# Patient Record
Sex: FEMALE | Race: BLACK OR AFRICAN AMERICAN | NOT HISPANIC OR LATINO | Employment: OTHER | URBAN - METROPOLITAN AREA
[De-identification: names, ages, dates, MRNs, and addresses within clinical notes are randomized per-mention and may not be internally consistent; named-entity substitution may affect disease eponyms.]

---

## 2017-03-06 ENCOUNTER — OFFICE VISIT (OUTPATIENT)
Dept: OBSTETRICS AND GYNECOLOGY | Facility: CLINIC | Age: 57
End: 2017-03-06

## 2017-03-06 VITALS
DIASTOLIC BLOOD PRESSURE: 73 MMHG | SYSTOLIC BLOOD PRESSURE: 118 MMHG | HEART RATE: 75 BPM | BODY MASS INDEX: 23.08 KG/M2 | WEIGHT: 143.6 LBS | HEIGHT: 66 IN

## 2017-03-06 DIAGNOSIS — N94.11 SUPERFICIAL DYSPAREUNIA: Primary | ICD-10-CM

## 2017-03-06 DIAGNOSIS — Z12.4 PAP SMEAR FOR CERVICAL CANCER SCREENING: ICD-10-CM

## 2017-03-06 DIAGNOSIS — N95.2 ATROPHIC VAGINITIS: ICD-10-CM

## 2017-03-06 DIAGNOSIS — N93.0 POSTCOITAL BLEEDING: ICD-10-CM

## 2017-03-06 DIAGNOSIS — Z01.419 ENCOUNTER FOR GYNECOLOGICAL EXAMINATION WITHOUT ABNORMAL FINDING: ICD-10-CM

## 2017-03-06 PROCEDURE — G0101 CA SCREEN;PELVIC/BREAST EXAM: HCPCS | Performed by: OBSTETRICS & GYNECOLOGY

## 2017-03-06 RX ORDER — CYCLOBENZAPRINE HCL 5 MG
5 TABLET ORAL EVERY 12 HOURS
COMMUNITY
Start: 2013-11-04

## 2017-03-06 RX ORDER — TRAMADOL HYDROCHLORIDE 50 MG/1
TABLET ORAL
COMMUNITY
Start: 2017-02-28

## 2017-03-06 NOTE — PROGRESS NOTES
Subjective   Rosie Maharaj is a 56 y.o. female  1, Para 1 AB 0, Living 1.  Last annual 1y, last pap , last mammogram 1y, last colonoscopy 8y.  An annual exam  History of Present Illness  Patient's only problem is some postcoital spotting and superficial dyspareunia  The following portions of the patient's history were reviewed and updated as appropriate: allergies, current medications, past family history, past medical history, past social history, past surgical history and problem list.    Review of Systems   Genitourinary: Positive for dyspareunia.        Postcoital spotting   All other systems reviewed and are negative.        Past Medical History   Diagnosis Date   • Liver disease, chronic      Abnormal liver enzymes under workup     Menstrual History:  OB History      Para Term  AB TAB SAB Ectopic Multiple Living    1 1        1         Menarche age: 13  No LMP recorded. Patient has had an ablation.  Period Cycle (Days): 28  Period Pattern:post coital spotting  Menstrual Flow: Light  Menstrual Control: Panty liner  Dysmenorrhea: None    Past Surgical History   Procedure Laterality Date   • Tubal abdominal ligation     • Endometrial ablation     • Lumbar laminectomy       OB History      Para Term  AB TAB SAB Ectopic Multiple Living    1 1        1        Family History   Problem Relation Age of Onset   • Breast cancer Mother 60   • Diabetes Mother    • Stomach cancer Brother    • Hypertension Brother      History   Smoking Status   • Former Smoker   • Packs/day: 1.00   • Years: 10.00   Smokeless Tobacco   • Never Used     Comment: Stopped at age 26     History   Alcohol Use No     Health Maintenance   Topic Date Due   • TDAP/TD VACCINES (1 - Tdap) 1980   • INFLUENZA VACCINE  2016   • HEPATITIS C SCREENING  2017   • MEDICARE ANNUAL WELLNESS  2017       Current Outpatient Prescriptions:   •  cyclobenzaprine (FLEXERIL) 5 MG tablet, Take 5 mg by  "mouth Every 12 (Twelve) Hours., Disp: , Rfl:   •  traMADol (ULTRAM) 50 MG tablet, , Disp: , Rfl:   Sexual History: Active  Sexually Transmitted Infection History: Gonorrhea    I.       Objective   Vitals:    03/06/17 1037   BP: 118/73   Pulse: 75   Weight: 143 lb 9.6 oz (65.1 kg)   Height: 66\" (167.6 cm)     Physical Exam   Constitutional: She is oriented to person, place, and time. She appears well-developed and well-nourished.   HENT:   Head: Normocephalic.   Eyes: Pupils are equal, round, and reactive to light.   Neck: Normal range of motion. No thyromegaly present.   Cardiovascular: Normal rate, regular rhythm, normal heart sounds and intact distal pulses.    Pulmonary/Chest: Effort normal and breath sounds normal. No respiratory distress. She exhibits no tenderness. Right breast exhibits no inverted nipple, no mass, no nipple discharge, no skin change and no tenderness. Left breast exhibits no inverted nipple, no mass, no nipple discharge, no skin change and no tenderness. Breasts are symmetrical.   Abdominal: Soft. Bowel sounds are normal. Hernia confirmed negative in the right inguinal area and confirmed negative in the left inguinal area.   Genitourinary: Rectum normal, vagina normal and uterus normal. Rectal exam shows no external hemorrhoid, no internal hemorrhoid, no fissure, no mass, no tenderness and anal tone normal. No breast tenderness or discharge. Pelvic exam was performed with patient supine. There is no rash, tenderness, lesion or injury on the right labia. There is no rash, tenderness, lesion or injury on the left labia. Uterus is not enlarged and not tender. Cervix exhibits no motion tenderness, no discharge and no friability. Right adnexum displays no mass, no tenderness and no fullness. Left adnexum displays no mass, no tenderness and no fullness. No vaginal discharge found.   Genitourinary Comments: Vaginal atrophy   Lymphadenopathy:     She has no cervical adenopathy.        Right: No " inguinal adenopathy present.        Left: No inguinal adenopathy present.   Neurological: She is alert and oriented to person, place, and time. She has normal reflexes.   Skin: Skin is warm and dry.   Psychiatric: She has a normal mood and affect. Her behavior is normal. Judgment and thought content normal.         Assessment/Plan   Rosie was seen today for gynecologic exam.    Diagnoses and all orders for this visit:    Superficial dyspareunia    Atrophic vaginitis    Encounter for gynecological examination without abnormal finding  Comments:  Schedule mammogram, Pap smear    Postcoital bleeding    Pap smear for cervical cancer screening  -     IGP, Aptima HPV, Rfx 16 / 18,45

## 2017-03-10 LAB
CYTOLOGIST CVX/VAG CYTO: NORMAL
CYTOLOGY CVX/VAG DOC THIN PREP: NORMAL
DX ICD CODE: NORMAL
HIV 1 & 2 AB SER-IMP: NORMAL
HPV I/H RISK 4 DNA CVX QL PROBE+SIG AMP: NEGATIVE
OTHER STN SPEC: NORMAL
PATH REPORT.FINAL DX SPEC: NORMAL
STAT OF ADQ CVX/VAG CYTO-IMP: NORMAL

## 2017-03-20 ENCOUNTER — APPOINTMENT (OUTPATIENT)
Dept: WOMENS IMAGING | Facility: HOSPITAL | Age: 57
End: 2017-03-20

## 2017-03-20 PROCEDURE — G0202 SCR MAMMO BI INCL CAD: HCPCS | Performed by: RADIOLOGY

## 2017-03-20 PROCEDURE — 77063 BREAST TOMOSYNTHESIS BI: CPT | Performed by: RADIOLOGY

## 2017-03-21 ENCOUNTER — TELEPHONE (OUTPATIENT)
Dept: OBSTETRICS AND GYNECOLOGY | Facility: CLINIC | Age: 57
End: 2017-03-21

## 2017-03-21 NOTE — TELEPHONE ENCOUNTER
----- Message from Ignacio England MD sent at 3/21/2017 11:00 AM EDT -----  Tell the patient her mammogram was normal  ----- Message -----     From: Interface, Scans Incoming     Sent: 3/21/2017   9:42 AM       To: Ignacio England MD

## 2018-03-27 ENCOUNTER — PROCEDURE VISIT (OUTPATIENT)
Dept: OBSTETRICS AND GYNECOLOGY | Facility: CLINIC | Age: 58
End: 2018-03-27

## 2018-03-27 ENCOUNTER — TELEPHONE (OUTPATIENT)
Dept: OBSTETRICS AND GYNECOLOGY | Facility: CLINIC | Age: 58
End: 2018-03-27

## 2018-03-27 ENCOUNTER — APPOINTMENT (OUTPATIENT)
Dept: WOMENS IMAGING | Facility: HOSPITAL | Age: 58
End: 2018-03-27

## 2018-03-27 DIAGNOSIS — Z87.39 HISTORY OF OSTEOPENIA: ICD-10-CM

## 2018-03-27 DIAGNOSIS — Z12.31 VISIT FOR SCREENING MAMMOGRAM: Primary | ICD-10-CM

## 2018-03-27 DIAGNOSIS — Z00.00 ENCOUNTER FOR PREVENTIVE HEALTH EXAMINATION: Primary | ICD-10-CM

## 2018-03-27 PROCEDURE — 77080 DXA BONE DENSITY AXIAL: CPT | Performed by: OBSTETRICS & GYNECOLOGY

## 2018-03-27 PROCEDURE — 77067 SCR MAMMO BI INCL CAD: CPT | Performed by: OBSTETRICS & GYNECOLOGY

## 2018-03-27 PROCEDURE — 77067 SCR MAMMO BI INCL CAD: CPT | Performed by: RADIOLOGY

## 2018-03-27 NOTE — TELEPHONE ENCOUNTER
Left message to call office, called pt about dexa results showing mild osteopenia. She will need to take calcium daily and we will recheck in a few years. Rimma

## 2018-03-29 ENCOUNTER — TELEPHONE (OUTPATIENT)
Dept: OBSTETRICS AND GYNECOLOGY | Facility: CLINIC | Age: 58
End: 2018-03-29

## 2018-03-29 NOTE — TELEPHONE ENCOUNTER
----- Message from Ignacio England MD sent at 3/29/2018  9:27 AM EDT -----  Tell the patient her mammogram was negative  ----- Message -----  From: Interface, Scans Incoming  Sent: 3/29/2018   8:59 AM  To: Ignacio England MD

## 2019-08-02 ENCOUNTER — APPOINTMENT (OUTPATIENT)
Dept: WOMENS IMAGING | Facility: HOSPITAL | Age: 59
End: 2019-08-02

## 2019-08-02 ENCOUNTER — OFFICE VISIT (OUTPATIENT)
Dept: OBSTETRICS AND GYNECOLOGY | Facility: CLINIC | Age: 59
End: 2019-08-02

## 2019-08-02 ENCOUNTER — PROCEDURE VISIT (OUTPATIENT)
Dept: OBSTETRICS AND GYNECOLOGY | Facility: CLINIC | Age: 59
End: 2019-08-02

## 2019-08-02 VITALS
HEIGHT: 66 IN | DIASTOLIC BLOOD PRESSURE: 78 MMHG | BODY MASS INDEX: 23.46 KG/M2 | WEIGHT: 146 LBS | HEART RATE: 80 BPM | SYSTOLIC BLOOD PRESSURE: 129 MMHG

## 2019-08-02 DIAGNOSIS — Z01.419 WELL WOMAN EXAM WITH ROUTINE GYNECOLOGICAL EXAM: Primary | ICD-10-CM

## 2019-08-02 DIAGNOSIS — Z12.31 VISIT FOR SCREENING MAMMOGRAM: Primary | ICD-10-CM

## 2019-08-02 PROCEDURE — G0101 CA SCREEN;PELVIC/BREAST EXAM: HCPCS | Performed by: OBSTETRICS & GYNECOLOGY

## 2019-08-02 PROCEDURE — 77067 SCR MAMMO BI INCL CAD: CPT | Performed by: OBSTETRICS & GYNECOLOGY

## 2019-08-02 PROCEDURE — 77067 SCR MAMMO BI INCL CAD: CPT | Performed by: RADIOLOGY

## 2019-08-02 RX ORDER — ATORVASTATIN CALCIUM 10 MG/1
10 TABLET, FILM COATED ORAL
Refills: 1 | COMMUNITY
Start: 2019-06-10 | End: 2021-08-09

## 2019-08-02 RX ORDER — LEVOTHYROXINE SODIUM 0.03 MG/1
TABLET ORAL
COMMUNITY
End: 2021-08-09

## 2019-08-02 RX ORDER — POTASSIUM GLUCONATE 595(99)MG
TABLET ORAL
COMMUNITY

## 2019-08-02 RX ORDER — MELOXICAM 15 MG/1
TABLET ORAL
COMMUNITY
Start: 2019-05-16 | End: 2021-08-09

## 2019-08-02 RX ORDER — GABAPENTIN 300 MG/1
CAPSULE ORAL
COMMUNITY

## 2019-08-02 NOTE — PROGRESS NOTES
Chief Complaint   Patient presents with   • Annual Exam     last pap: 3.6.17 NL -HPV, last mammo: 8.2.19, last colonoscopy:  NL         Rosie Maharaj is a 58 y.o.  who presents for an annual examination.  C/o vaginal dryness, unrelieved with KY jelly    Pap history:  Last pap: 2017 NIL, HPV negative  Prior abnormal paps: no  DXA:  2018 - T score spine -1.5, screen at 66  Colonoscopy:  Yes   Mammogram: 19   STDs  Sexually active: yes  History of STDs: no  Menopause:  Age: 40's  Bleeding since? no  Vasomotor symptoms: no  HRT: no  Dyspareunia: yes- dryness      Is patient's family or personal history significant for any risks for BRCA? no    Past Medical History:   Diagnosis Date   • Liver disease, chronic     Abnormal liver enzymes under workup     Past Surgical History:   Procedure Laterality Date   • ENDOMETRIAL ABLATION     • LUMBAR LAMINECTOMY     • TUBAL ABDOMINAL LIGATION       OB History    Para Term  AB Living   1 1 1     1   SAB TAB Ectopic Molar Multiple Live Births             1      # Outcome Date GA Lbr Doc/2nd Weight Sex Delivery Anes PTL Lv   1 Term      Vag-Spont   DEC        Social History     Tobacco Use   • Smoking status: Former Smoker     Packs/day: 1.00     Years: 10.00     Pack years: 10.00   • Smokeless tobacco: Never Used   • Tobacco comment: Stopped at age 26   Substance Use Topics   • Alcohol use: No   • Drug use: No     Family History   Problem Relation Age of Onset   • Breast cancer Mother 60   • Diabetes Mother    • Stomach cancer Brother    • Hypertension Brother    • Deep vein thrombosis Neg Hx    • Pulmonary embolism Neg Hx    • Colon cancer Neg Hx    • Uterine cancer Neg Hx    • Ovarian cancer Neg Hx      Current Outpatient Medications on File Prior to Visit   Medication Sig Dispense Refill   • atorvastatin (LIPITOR) 10 MG tablet Take 10 mg by mouth every night at bedtime.  1   • Calcium Carb-Cholecalciferol (CALCIUM 1000 + D) 1000-800  "MG-UNIT tablet calcium   1 qd 1200mg     • Cod Liver Oil 10 MINIM capsule cod liver oil   daily     • cyclobenzaprine (FLEXERIL) 5 MG tablet Take 5 mg by mouth Every 12 (Twelve) Hours.     • gabapentin (NEURONTIN) 300 MG capsule gabapentin 300 mg capsule   TAKE 1 CAPSULE BY MOUTH ONCE DAILY--MUST LAST 30 DAYS.     • levothyroxine (SYNTHROID, LEVOTHROID) 25 MCG tablet levothyroxine 25 mcg tablet   Take 1 tablet every day by oral route.     • meloxicam (MOBIC) 15 MG tablet      • Multiple Vitamin (MULTI-VITAMIN DAILY PO) Multi Vitamin   daily     • traMADol (ULTRAM) 50 MG tablet        No current facility-administered medications on file prior to visit.      Allergies   Allergen Reactions   • Fosamax [Alendronate] Other (See Comments)     PER PT \"FELT WEIRD\"         Review of Systems   Constitutional: Negative for chills, fever and unexpected weight change.   HENT: Negative for congestion, nosebleeds and sore throat.    Eyes: Negative for visual disturbance.   Respiratory: Negative for cough, chest tightness and shortness of breath.    Cardiovascular: Negative for chest pain and palpitations.   Gastrointestinal: Negative for abdominal pain, constipation, diarrhea, nausea and vomiting.   Endocrine: Negative for polyuria.   Genitourinary: Negative for difficulty urinating, dyspareunia, dysuria, frequency, genital sores, hematuria, menstrual problem, pelvic pain, urgency, vaginal bleeding, vaginal discharge and vaginal pain.   Musculoskeletal: Negative for arthralgias and joint swelling.   Skin: Negative for color change and rash.   Neurological: Negative for dizziness, light-headedness and headaches.   Hematological: Does not bruise/bleed easily.   Psychiatric/Behavioral: Negative for dysphoric mood. The patient is not nervous/anxious.        OBJECTIVE:   Vitals:    08/02/19 0944   BP: 129/78   Pulse: 80   Weight: 66.2 kg (146 lb)   Height: 167.6 cm (66\")      Physical Exam    ASSESSMENT/PLAN:  Well woman " counseling/screening:    Cervical cancer screening:    Reports no h/o cervical dysplasia   The patient is due for a pap in 2022.    Screening guidelines discussed with patient  Breast cancer screening:    Mammogram UTD   Breast self awareness encouraged  Colonscopy:   UTD  DXA   Due at 66 (prior with osteopenia, Tscore of -1.5  Family history    does not demonstrate need for genetics referral   Healthy lifestyle counseling:   return for routine annual checkups    BMI Counseling  Her BMI is classified as BMI 18.5-24.9       Classification: normal weight.  We reviewed that this is classified as normal weight for age

## 2019-08-15 ENCOUNTER — TELEPHONE (OUTPATIENT)
Dept: OBSTETRICS AND GYNECOLOGY | Facility: CLINIC | Age: 59
End: 2019-08-15

## 2019-08-15 NOTE — TELEPHONE ENCOUNTER
----- Message from Danitza Arreola MD sent at 8/8/2019  4:02 PM EDT -----  Please let patient know she had a benign mammogram. She has heterogeneously dense breasts which may obscure small masses.  Follow up in one year.    (based on media tab) Letter with mammogram results was mailed by mammogram office on 8/6/19

## 2020-08-05 ENCOUNTER — APPOINTMENT (OUTPATIENT)
Dept: WOMENS IMAGING | Facility: HOSPITAL | Age: 60
End: 2020-08-05

## 2020-08-05 ENCOUNTER — PROCEDURE VISIT (OUTPATIENT)
Dept: OBSTETRICS AND GYNECOLOGY | Facility: CLINIC | Age: 60
End: 2020-08-05

## 2020-08-05 DIAGNOSIS — Z12.31 VISIT FOR SCREENING MAMMOGRAM: Primary | ICD-10-CM

## 2020-08-05 DIAGNOSIS — Z78.0 POST-MENOPAUSAL: Primary | ICD-10-CM

## 2020-08-05 PROCEDURE — 77063 BREAST TOMOSYNTHESIS BI: CPT | Performed by: RADIOLOGY

## 2020-08-05 PROCEDURE — 77067 SCR MAMMO BI INCL CAD: CPT | Performed by: RADIOLOGY

## 2020-08-05 PROCEDURE — 77063 BREAST TOMOSYNTHESIS BI: CPT | Performed by: OBSTETRICS & GYNECOLOGY

## 2020-08-05 PROCEDURE — 77067 SCR MAMMO BI INCL CAD: CPT | Performed by: OBSTETRICS & GYNECOLOGY

## 2021-08-09 ENCOUNTER — APPOINTMENT (OUTPATIENT)
Dept: WOMENS IMAGING | Facility: HOSPITAL | Age: 61
End: 2021-08-09

## 2021-08-09 ENCOUNTER — PROCEDURE VISIT (OUTPATIENT)
Dept: OBSTETRICS AND GYNECOLOGY | Facility: CLINIC | Age: 61
End: 2021-08-09

## 2021-08-09 ENCOUNTER — OFFICE VISIT (OUTPATIENT)
Dept: OBSTETRICS AND GYNECOLOGY | Facility: CLINIC | Age: 61
End: 2021-08-09

## 2021-08-09 VITALS
SYSTOLIC BLOOD PRESSURE: 131 MMHG | WEIGHT: 140 LBS | DIASTOLIC BLOOD PRESSURE: 83 MMHG | BODY MASS INDEX: 22.5 KG/M2 | HEIGHT: 66 IN | HEART RATE: 82 BPM

## 2021-08-09 DIAGNOSIS — Z12.31 VISIT FOR SCREENING MAMMOGRAM: Primary | ICD-10-CM

## 2021-08-09 DIAGNOSIS — Z01.419 WELL WOMAN EXAM WITH ROUTINE GYNECOLOGICAL EXAM: Primary | ICD-10-CM

## 2021-08-09 PROBLEM — R74.8 INCREASED CREATINE KINASE LEVEL: Status: ACTIVE | Noted: 2017-02-07

## 2021-08-09 PROBLEM — N39.3 FEMALE STRESS INCONTINENCE: Status: ACTIVE | Noted: 2018-09-04

## 2021-08-09 PROBLEM — M51.369 DEGENERATION OF LUMBAR INTERVERTEBRAL DISC: Status: ACTIVE | Noted: 2021-08-09

## 2021-08-09 PROBLEM — Z83.511 FAMILY HISTORY OF GLAUCOMA: Status: ACTIVE | Noted: 2017-02-28

## 2021-08-09 PROBLEM — E55.9 VITAMIN D DEFICIENCY: Status: ACTIVE | Noted: 2019-05-14

## 2021-08-09 PROBLEM — F32.A DEPRESSIVE DISORDER: Status: ACTIVE | Noted: 2020-07-30

## 2021-08-09 PROBLEM — E03.9 HYPOTHYROIDISM: Status: ACTIVE | Noted: 2019-05-28

## 2021-08-09 PROBLEM — M51.36 DEGENERATION OF LUMBAR INTERVERTEBRAL DISC: Status: ACTIVE | Noted: 2021-08-09

## 2021-08-09 PROBLEM — E04.1 THYROID NODULE: Status: ACTIVE | Noted: 2017-02-26

## 2021-08-09 PROBLEM — K59.00 CONSTIPATION: Status: ACTIVE | Noted: 2017-08-15

## 2021-08-09 PROBLEM — M85.80 OSTEOPENIA: Status: ACTIVE | Noted: 2018-06-07

## 2021-08-09 PROCEDURE — 77063 BREAST TOMOSYNTHESIS BI: CPT | Performed by: RADIOLOGY

## 2021-08-09 PROCEDURE — 77063 BREAST TOMOSYNTHESIS BI: CPT | Performed by: OBSTETRICS & GYNECOLOGY

## 2021-08-09 PROCEDURE — 77067 SCR MAMMO BI INCL CAD: CPT | Performed by: RADIOLOGY

## 2021-08-09 PROCEDURE — 77067 SCR MAMMO BI INCL CAD: CPT | Performed by: OBSTETRICS & GYNECOLOGY

## 2021-08-09 PROCEDURE — 99396 PREV VISIT EST AGE 40-64: CPT | Performed by: OBSTETRICS & GYNECOLOGY

## 2021-08-09 NOTE — PROGRESS NOTES
Chief Complaint   Patient presents with   • Annual Exam     pap: 3/2017 NIL -HPV, mammo: 2021, colonoscopy: 2019        Rosie Maharaj is a 60 y.o.  who presents for an annual examination.      Pap history:  Last pap: 2017 NIL, HPV negative  Prior abnormal paps: no  DXA:  2018 - T score spine -1.5, screen at 66  Colonoscopy:  Yes 2019 - normal, repeat in 10 years  Mammogram: 22   STDs  Sexually active: yes, no intercourse in 3 yeras  History of STDs: no  Menopause:  Age: 40's  Bleeding since? no  Vasomotor symptoms: no  HRT: no  Dyspareunia: c/o dryness but has not been sexually active recently     Is patient's family or personal history significant for any risks for BRCA? no    Past Medical History:   Diagnosis Date   • Bony sclerosis    • Liver disease, chronic     Abnormal liver enzymes under workup     Past Surgical History:   Procedure Laterality Date   • ENDOMETRIAL ABLATION     • LUMBAR LAMINECTOMY     • TUBAL ABDOMINAL LIGATION       OB History    Para Term  AB Living   1 1 1     1   SAB TAB Ectopic Molar Multiple Live Births             1      # Outcome Date GA Lbr Doc/2nd Weight Sex Delivery Anes PTL Lv   1 Term      Vag-Spont   DEC     Social History     Tobacco Use   • Smoking status: Former Smoker     Packs/day: 1.00     Years: 10.00     Pack years: 10.00   • Smokeless tobacco: Never Used   • Tobacco comment: Stopped at age 26   Substance Use Topics   • Alcohol use: No   • Drug use: No     Family History   Problem Relation Age of Onset   • Breast cancer Mother 60   • Diabetes Mother    • Stomach cancer Brother    • Hypertension Brother    • Deep vein thrombosis Neg Hx    • Pulmonary embolism Neg Hx    • Colon cancer Neg Hx    • Uterine cancer Neg Hx    • Ovarian cancer Neg Hx      Current Outpatient Medications on File Prior to Visit   Medication Sig Dispense Refill   • Calcium Carb-Cholecalciferol (CALCIUM 1000 + D) 1000-800 MG-UNIT tablet calcium   1 qd 1200mg    "  • Cod Liver Oil 10 MINIM capsule cod liver oil   daily     • cyclobenzaprine (FLEXERIL) 5 MG tablet Take 5 mg by mouth Every 12 (Twelve) Hours.     • gabapentin (NEURONTIN) 300 MG capsule gabapentin 300 mg capsule   TAKE 1 CAPSULE BY MOUTH ONCE DAILY--MUST LAST 30 DAYS.     • Multiple Vitamin (MULTI-VITAMIN DAILY PO) Multi Vitamin   daily     • traMADol (ULTRAM) 50 MG tablet      • [DISCONTINUED] atorvastatin (LIPITOR) 10 MG tablet Take 10 mg by mouth every night at bedtime.  1   • [DISCONTINUED] levothyroxine (SYNTHROID, LEVOTHROID) 25 MCG tablet levothyroxine 25 mcg tablet   Take 1 tablet every day by oral route.     • [DISCONTINUED] meloxicam (MOBIC) 15 MG tablet        No current facility-administered medications on file prior to visit.     Allergies   Allergen Reactions   • Fosamax [Alendronate] Other (See Comments)     PER PT \"FELT WEIRD\"         Review of Systems   Constitutional: Negative for chills, fever and unexpected weight change.   HENT: Negative for congestion, nosebleeds and sore throat.    Eyes: Negative for visual disturbance.   Respiratory: Negative for cough, chest tightness and shortness of breath.    Cardiovascular: Negative for chest pain and palpitations.   Gastrointestinal: Negative for abdominal pain, constipation, diarrhea, nausea and vomiting.   Endocrine: Negative for polyuria.   Genitourinary: Negative for difficulty urinating, dyspareunia, dysuria, frequency, genital sores, hematuria, menstrual problem, pelvic pain, urgency, vaginal bleeding, vaginal discharge and vaginal pain.   Musculoskeletal: Negative for arthralgias and joint swelling.   Skin: Negative for color change and rash.   Neurological: Negative for dizziness, light-headedness and headaches.   Hematological: Does not bruise/bleed easily.   Psychiatric/Behavioral: Negative for dysphoric mood. The patient is not nervous/anxious.        OBJECTIVE:   Vitals:    08/09/21 1107   BP: 131/83   Pulse: 82   Weight: 63.5 kg (140 " "lb)   Height: 167.6 cm (66\")      Physical Exam   Constitutional: She is oriented to person, place, and time. She appears well-developed and well-nourished. No distress.   HENT:   Head: Normocephalic and atraumatic.   Eyes: No scleral icterus.   Neck: No thyromegaly present.   Cardiovascular: Normal rate and regular rhythm. Exam reveals no gallop and no friction rub.   No murmur heard.  Pulmonary/Chest: Effort normal and breath sounds normal. No respiratory distress. She has no wheezes. She has no rales. She exhibits no tenderness. Right breast exhibits no inverted nipple. Left breast exhibits no inverted nipple. No breast swelling, tenderness, discharge or bleeding. Breasts are symmetrical.   Abdominal: Soft. Bowel sounds are normal. She exhibits no distension. There is no abdominal tenderness. There is no guarding.   Genitourinary: Vagina normal and uterus normal. There is no rash, tenderness, lesion, injury or Bartholin's cyst on the right labia. There is no rash, tenderness, lesion, injury or Bartholin's cyst on the left labia. Uterus is not mobile.   Cervix is not parous. Cervix does not exhibit motion tenderness, discharge, friability, lesion, polyp, nabothian cyst, eversion, pinkness or cyanosis. Right adnexum displays no mass, no tenderness and no fullness. Right adnexum is present.Left adnexum displays no mass, no tenderness and no fullness. Left adnexum is present.No vaginal discharge found.   Musculoskeletal: No deformity.   Neurological: She is alert and oriented to person, place, and time.   Skin: Skin is warm and dry. She is not diaphoretic.   Psychiatric: Her speech is normal and behavior is normal. Judgment and thought content normal.       ASSESSMENT/PLAN:  Well woman counseling/screening:    Cervical cancer screening:    Reports no h/o cervical dysplasia   The patient is due for a pap in 2022.    Screening guidelines discussed with patient  Breast cancer screening:    Mammogram UTD   Breast self " awareness encouraged  Colonscopy:   UTD  DXA   Due at 66 (prior with osteopenia, Tscore of -1.5)  Family history    does not demonstrate need for genetics referral   Healthy lifestyle counseling:   return for routine annual checkups    BMI Counseling  Her BMI is classified as BMI 18.5-24.9       Classification: normal weight.  We reviewed that this is classified as normal weight for age

## 2022-07-14 ENCOUNTER — TELEPHONE (OUTPATIENT)
Dept: OBSTETRICS AND GYNECOLOGY | Facility: CLINIC | Age: 62
End: 2022-07-14

## 2022-07-14 NOTE — TELEPHONE ENCOUNTER
"Please reach out to patient. She had a DXA in 2020 at [facility] that showed lumbar -2.2 and left femoral neck -2.0. Typically would recommend repeat in 2 years (2022). I know she has an appointment with us coming up. Please see if she has had or would like to repeat her DXA prior.  Also, as an aside- she has medicare listed.  Last \"annual\" was 2021 and typically Medicare will only allow for an \"annual\" every 2 years, but mammogram every year and a problem visit as needed. Thanks!  "

## 2022-07-26 NOTE — TELEPHONE ENCOUNTER
Message left for patient to call office. She has an appointment scheduled with Dr. Arreola in November and she has MC. Also, she is due for a Dexa. Please see if she would like to get that scheduled. Thanks-Sarah

## 2022-07-28 NOTE — TELEPHONE ENCOUNTER
Patient is returning call and would like to schedule bone density at [facility] if possible.    Thank you

## 2022-08-01 DIAGNOSIS — Z78.0 POSTMENOPAUSAL: Primary | ICD-10-CM

## 2022-11-07 ENCOUNTER — PROCEDURE VISIT (OUTPATIENT)
Dept: OBSTETRICS AND GYNECOLOGY | Facility: CLINIC | Age: 62
End: 2022-11-07

## 2022-11-07 ENCOUNTER — APPOINTMENT (OUTPATIENT)
Dept: WOMENS IMAGING | Facility: HOSPITAL | Age: 62
End: 2022-11-07

## 2022-11-07 DIAGNOSIS — Z12.31 VISIT FOR SCREENING MAMMOGRAM: Primary | ICD-10-CM

## 2022-11-07 PROCEDURE — 77067 SCR MAMMO BI INCL CAD: CPT | Performed by: RADIOLOGY

## 2022-11-07 PROCEDURE — 77063 BREAST TOMOSYNTHESIS BI: CPT | Performed by: RADIOLOGY

## 2022-11-07 PROCEDURE — 77063 BREAST TOMOSYNTHESIS BI: CPT | Performed by: OBSTETRICS & GYNECOLOGY

## 2022-11-07 PROCEDURE — 77067 SCR MAMMO BI INCL CAD: CPT | Performed by: OBSTETRICS & GYNECOLOGY

## 2022-11-18 ENCOUNTER — TELEPHONE (OUTPATIENT)
Dept: OBSTETRICS AND GYNECOLOGY | Facility: CLINIC | Age: 62
End: 2022-11-18

## 2022-11-18 NOTE — TELEPHONE ENCOUNTER
----- Message from Danitza Arreola MD sent at 11/17/2022  2:02 PM EST -----  Please make sure patient is aware of benign mammogram imaging.  Thanks!

## 2024-02-02 ENCOUNTER — OFFICE VISIT (OUTPATIENT)
Dept: OBSTETRICS AND GYNECOLOGY | Facility: CLINIC | Age: 64
End: 2024-02-02
Payer: MEDICARE

## 2024-02-02 VITALS
DIASTOLIC BLOOD PRESSURE: 77 MMHG | HEART RATE: 89 BPM | HEIGHT: 66 IN | SYSTOLIC BLOOD PRESSURE: 144 MMHG | WEIGHT: 145 LBS | BODY MASS INDEX: 23.3 KG/M2

## 2024-02-02 DIAGNOSIS — Z01.419 WELL WOMAN EXAM WITH ROUTINE GYNECOLOGICAL EXAM: Primary | ICD-10-CM

## 2024-02-02 DIAGNOSIS — M81.0 AGE-RELATED OSTEOPOROSIS WITHOUT CURRENT PATHOLOGICAL FRACTURE: ICD-10-CM

## 2024-02-02 LAB
ALBUMIN SERPL-MCNC: 4.4 G/DL (ref 3.5–5.2)
ALBUMIN/GLOB SERPL: 1.9 G/DL
ALP SERPL-CCNC: 106 U/L (ref 39–117)
ALT SERPL-CCNC: 33 U/L (ref 1–33)
AST SERPL-CCNC: 29 U/L (ref 1–32)
BILIRUB SERPL-MCNC: 0.4 MG/DL (ref 0–1.2)
BUN SERPL-MCNC: 8 MG/DL (ref 8–23)
BUN/CREAT SERPL: 13.6 (ref 7–25)
CALCIUM SERPL-MCNC: 9.7 MG/DL (ref 8.6–10.5)
CHLORIDE SERPL-SCNC: 103 MMOL/L (ref 98–107)
CO2 SERPL-SCNC: 28.2 MMOL/L (ref 22–29)
CREAT SERPL-MCNC: 0.59 MG/DL (ref 0.57–1)
EGFRCR SERPLBLD CKD-EPI 2021: 102 ML/MIN/1.73
GLOBULIN SER CALC-MCNC: 2.3 GM/DL
GLUCOSE SERPL-MCNC: 67 MG/DL (ref 65–99)
POTASSIUM SERPL-SCNC: 4 MMOL/L (ref 3.5–5.2)
PROT SERPL-MCNC: 6.7 G/DL (ref 6–8.5)
SODIUM SERPL-SCNC: 141 MMOL/L (ref 136–145)

## 2024-02-02 RX ORDER — CYCLOBENZAPRINE HCL 10 MG
TABLET ORAL
COMMUNITY
Start: 2023-10-26

## 2024-02-02 RX ORDER — PHENTERMINE HYDROCHLORIDE 37.5 MG/1
TABLET ORAL
COMMUNITY

## 2024-02-02 RX ORDER — LEVOTHYROXINE SODIUM 0.03 MG/1
TABLET ORAL
COMMUNITY
Start: 2023-10-23

## 2024-02-02 RX ORDER — LATANOPROST 50 UG/ML
SOLUTION/ DROPS OPHTHALMIC
COMMUNITY

## 2024-02-02 RX ORDER — TRIAMTERENE AND HYDROCHLOROTHIAZIDE 75; 50 MG/1; MG/1
TABLET ORAL
COMMUNITY

## 2024-02-02 RX ORDER — PRAVASTATIN SODIUM 20 MG
TABLET ORAL
COMMUNITY

## 2024-02-02 NOTE — PROGRESS NOTES
"Chief Complaint   Patient presents with    Annual Exam     Last ae: 2021  Last pap: 3/6/2017  Mammo: 2023  DXA: 2023        Rosie Schwarz is a 63 y.o.  who presents for an annual examination.      Pap history:  Last pap: 2017 NIL, HPV negative  Prior abnormal paps: no  DXA:  : Lumbar spine osteoporosis, left hip femoral neck -2.2  - reports she tried alendronate years ago for osteopenia and didn't feel \"right\"  - reports was sent to a [facility] specialist but did not go  Colonoscopy:  Yes  - normal, repeat in 10 years  Mammogram: 2023   STDs  Sexually active: yes, no intercourse in 3 yeras  History of STDs: no  Menopause:  Age: 40's  Bleeding since? no  Vasomotor symptoms: no  HRT: no  Dyspareunia: c/o dryness but has not been sexually active recently   Patient reports that she is not currently experiencing any symptoms of urinary incontinence.      Is patient's family or personal history significant for any risks for BRCA? no    Past Medical History:   Diagnosis Date    Bony sclerosis     Liver disease, chronic     Abnormal liver enzymes under workup     Past Surgical History:   Procedure Laterality Date    ENDOMETRIAL ABLATION      LUMBAR LAMINECTOMY      TUBAL ABDOMINAL LIGATION       OB History    Para Term  AB Living   1 1 1     1   SAB IAB Ectopic Molar Multiple Live Births             1      # Outcome Date GA Lbr Doc/2nd Weight Sex Delivery Anes PTL Lv   1 Term      Vag-Spont   DEC     Social History     Tobacco Use    Smoking status: Former     Packs/day: 1.00     Years: 10.00     Additional pack years: 0.00     Total pack years: 10.00     Types: Cigarettes    Smokeless tobacco: Never    Tobacco comments:     Stopped at age 26   Substance Use Topics    Alcohol use: No    Drug use: No     Family History   Problem Relation Age of Onset    Breast cancer Mother 60    Diabetes Mother     Stomach cancer Brother     Hypertension Brother     Deep vein thrombosis " "Neg Hx     Pulmonary embolism Neg Hx     Colon cancer Neg Hx     Uterine cancer Neg Hx     Ovarian cancer Neg Hx      Current Outpatient Medications on File Prior to Visit   Medication Sig Dispense Refill    Calcium Carb-Cholecalciferol (CALCIUM 1000 + D) 1000-800 MG-UNIT tablet calcium   1 qd 1200mg      Cod Liver Oil 10 MINIM capsule cod liver oil   daily      cyclobenzaprine (FLEXERIL) 10 MG tablet       gabapentin (NEURONTIN) 300 MG capsule gabapentin 300 mg capsule   TAKE 1 CAPSULE BY MOUTH ONCE DAILY--MUST LAST 30 DAYS.      latanoprost (XALATAN) 0.005 % ophthalmic solution latanoprost 0.005 % eye drops   INSTILL 1 DROP INTO EACH EYE EVERY DAY AT BEDTIME      levothyroxine (SYNTHROID, LEVOTHROID) 25 MCG tablet       Multiple Vitamin (MULTI-VITAMIN DAILY PO) Multi Vitamin   daily      phentermine (ADIPEX-P) 37.5 MG tablet phentermine 37.5 mg tablet   Take 1 tablet every day by oral route in the morning for 30 days.      pravastatin (PRAVACHOL) 20 MG tablet pravastatin 20 mg tablet   Take 1 tablet by mouth once daily      traMADol (ULTRAM) 50 MG tablet       triamterene-hydrochlorothiazide (MAXZIDE) 75-50 MG per tablet triamterene 75 mg-hydrochlorothiazide 50 mg tablet   Take 1 tablet every day by oral route for 30 days.      [DISCONTINUED] cyclobenzaprine (FLEXERIL) 5 MG tablet Take 5 mg by mouth Every 12 (Twelve) Hours.       No current facility-administered medications on file prior to visit.     Allergies   Allergen Reactions    Fosamax [Alendronate] Other (See Comments)     PER PT \"FELT WEIRD\"         Review of Systems    OBJECTIVE:   Vitals:    02/02/24 0902   BP: 144/77   Pulse: 89   Weight: 65.8 kg (145 lb)   Height: 167.6 cm (65.98\")      Physical Exam   Constitutional: She is oriented to person, place, and time. She appears well-developed and well-nourished. No distress.   HENT:   Head: Normocephalic and atraumatic.   Eyes: No scleral icterus.   Neck: No thyromegaly present.   Cardiovascular: Normal " rate and regular rhythm. Exam reveals no gallop and no friction rub.   No murmur heard.  Pulmonary/Chest: Effort normal and breath sounds normal. No respiratory distress. She has no wheezes. She has no rales. She exhibits no tenderness. Right breast exhibits no inverted nipple. Left breast exhibits no inverted nipple. No breast swelling, tenderness, discharge or bleeding. Breasts are symmetrical.   Abdominal: Soft. Bowel sounds are normal. She exhibits no distension. There is no abdominal tenderness. There is no guarding.   Genitourinary: Vagina normal and uterus normal. There is no rash, tenderness, lesion, injury or Bartholin's cyst on the right labia. There is no rash, tenderness, lesion, injury or Bartholin's cyst on the left labia. Uterus is not mobile.   Cervix is not parous. Cervix does not exhibit motion tenderness, discharge, friability, lesion, polyp, nabothian cyst, eversion, pinkness or cyanosis. Right adnexum displays no mass, no tenderness and no fullness. Right adnexum is present.Left adnexum displays no mass, no tenderness and no fullness. Left adnexum is present.No vaginal discharge found.   Musculoskeletal: No deformity.   Neurological: She is alert and oriented to person, place, and time.   Skin: Skin is warm and dry. She is not diaphoretic.   Psychiatric: Her speech is normal and behavior is normal. Judgment and thought content normal.       ASSESSMENT/PLAN:  Well woman counseling/screening:    Cervical cancer screening:    Reports no h/o cervical dysplasia   The patient is due for a pap today   Screening guidelines discussed with patient  Breast cancer screening:    Mammogram UTD   Breast self awareness encouraged  Colonscopy:   UTD  DXA   Dx'd with osteoporosis in 2023   - labs today   - counseled on medications. Reports did not feel well with fosamax that she took for osteopenia years ago.  Counseled on alternative bisphosphonates, Prolia, Evista. Will follow up after labs result  Family  history    does not demonstrate need for genetics referral   Healthy lifestyle counseling:   return for routine annual checkups   Aware of BP elevation, asymptomatic    Diagnoses and all orders for this visit:    1. Well woman exam with routine gynecological exam (Primary)  -     IGP, Rfx Aptima HPV ASCU    2. Age-related osteoporosis without current pathological fracture  -     Comprehensive Metabolic Panel  -     Vitamin D 25 Hydroxy

## 2024-02-03 LAB — 25(OH)D3+25(OH)D2 SERPL-MCNC: 37.3 NG/ML (ref 30–100)

## 2024-02-06 ENCOUNTER — TELEPHONE (OUTPATIENT)
Dept: OBSTETRICS AND GYNECOLOGY | Facility: CLINIC | Age: 64
End: 2024-02-06
Payer: MEDICARE

## 2024-02-06 DIAGNOSIS — M81.0 AGE-RELATED OSTEOPOROSIS WITHOUT CURRENT PATHOLOGICAL FRACTURE: Primary | ICD-10-CM

## 2024-02-06 LAB
CONV .: NORMAL
CYTOLOGIST CVX/VAG CYTO: NORMAL
CYTOLOGY CVX/VAG DOC CYTO: NORMAL
CYTOLOGY CVX/VAG DOC THIN PREP: NORMAL
DX ICD CODE: NORMAL
HIV 1 & 2 AB SER-IMP: NORMAL
OTHER STN SPEC: NORMAL
STAT OF ADQ CVX/VAG CYTO-IMP: NORMAL

## 2024-02-06 RX ORDER — IBANDRONATE SODIUM 150 MG/1
150 TABLET, FILM COATED ORAL
Qty: 3 TABLET | Refills: 4 | Status: SHIPPED | OUTPATIENT
Start: 2024-02-06

## 2024-02-06 NOTE — TELEPHONE ENCOUNTER
Call patient reviewed that calcium and vitamin D were normal.  We have discussed previously all the options for osteoporosis treatment.  Today, she would like to try Boniva as a monthly pill.  She is aware that this is related to Fosamax.  Her reaction to Fosamax was nonspecific, reporting she just felt weird.  I discussed how to take this medication including to sit upright for an hour after taking it on an empty stomach, full glass of water.  She is aware.  Rx sent x 1 year and will repeat DXA after 2 years of treatment

## 2024-02-07 ENCOUNTER — TELEPHONE (OUTPATIENT)
Dept: OBSTETRICS AND GYNECOLOGY | Facility: CLINIC | Age: 64
End: 2024-02-07
Payer: MEDICARE

## 2024-02-07 NOTE — TELEPHONE ENCOUNTER
----- Message from Danitza Arreola MD sent at 2/7/2024  3:40 PM EST -----  Please make sure patient is aware of normal pap.

## 2025-03-10 RX ORDER — IBANDRONATE SODIUM 150 MG/1
TABLET, FILM COATED ORAL
Qty: 3 TABLET | Refills: 0 | Status: SHIPPED | OUTPATIENT
Start: 2025-03-10

## 2025-06-11 DIAGNOSIS — M81.0 AGE-RELATED OSTEOPOROSIS WITHOUT CURRENT PATHOLOGICAL FRACTURE: Primary | ICD-10-CM

## 2025-06-12 RX ORDER — IBANDRONATE SODIUM 150 MG/1
TABLET, FILM COATED ORAL
Qty: 3 TABLET | Refills: 0 | Status: SHIPPED | OUTPATIENT
Start: 2025-06-12

## 2025-06-12 NOTE — TELEPHONE ENCOUNTER
Rx sent for 3 months. Patient due for vit d/calcium check and DXA due in November (order placed). Would recommend DXA on same machine if feasible. Thanks!

## 2025-06-24 ENCOUNTER — TELEPHONE (OUTPATIENT)
Dept: OBSTETRICS AND GYNECOLOGY | Facility: CLINIC | Age: 65
End: 2025-06-24
Payer: MEDICARE

## 2025-06-24 NOTE — TELEPHONE ENCOUNTER
Left general message for patient to let me know better date/time for Dexa after 11/14/2025 at [email or web address] FOR HUB TO RELAY.  TY